# Patient Record
Sex: FEMALE | Race: WHITE | HISPANIC OR LATINO | Employment: FULL TIME | URBAN - NONMETROPOLITAN AREA
[De-identification: names, ages, dates, MRNs, and addresses within clinical notes are randomized per-mention and may not be internally consistent; named-entity substitution may affect disease eponyms.]

---

## 2020-08-11 PROCEDURE — 87635 SARS-COV-2 COVID-19 AMP PRB: CPT | Performed by: NURSE PRACTITIONER

## 2021-12-21 ENCOUNTER — OFFICE VISIT (OUTPATIENT)
Dept: BARIATRICS/WEIGHT MGMT | Facility: HOSPITAL | Age: 31
End: 2021-12-21

## 2021-12-21 ENCOUNTER — OFFICE VISIT (OUTPATIENT)
Dept: BARIATRICS/WEIGHT MGMT | Facility: CLINIC | Age: 31
End: 2021-12-21

## 2021-12-21 VITALS
WEIGHT: 258.4 LBS | DIASTOLIC BLOOD PRESSURE: 87 MMHG | TEMPERATURE: 98.4 F | OXYGEN SATURATION: 98 % | HEIGHT: 63 IN | HEART RATE: 89 BPM | BODY MASS INDEX: 45.79 KG/M2 | SYSTOLIC BLOOD PRESSURE: 130 MMHG

## 2021-12-21 DIAGNOSIS — E66.01 CLASS 3 SEVERE OBESITY DUE TO EXCESS CALORIES WITHOUT SERIOUS COMORBIDITY WITH BODY MASS INDEX (BMI) OF 45.0 TO 49.9 IN ADULT (HCC): Primary | ICD-10-CM

## 2021-12-21 PROCEDURE — 99203 OFFICE O/P NEW LOW 30 MIN: CPT | Performed by: SURGERY

## 2021-12-21 RX ORDER — EPINEPHRINE 1 MG/ML
INJECTION, SOLUTION INTRAMUSCULAR; SUBCUTANEOUS
COMMUNITY

## 2021-12-21 RX ORDER — ALBUTEROL SULFATE 90 UG/1
2 AEROSOL, METERED RESPIRATORY (INHALATION) EVERY 4 HOURS PRN
COMMUNITY

## 2021-12-21 NOTE — PROGRESS NOTES
"Nutrition Services    Patient Name:  Hannah Anguiano  YOB: 1990  MRN: [id number]  Admit Date:  (Not on file)    NUTRITION BARIATRIC/[facility] NOTE     Visit 1  Initial Assessment   BA#   [facility]    Anthropometrics   Height: 62.5 in  Weight: 258 lbs 6.4 oz  BMI: 46.51    Nutrition Recall  Eating ___2___ meals daily   Snacking  Making healthier choices  Excessive sweet intake  Drinking carbonated beverages-diet  Drinking less than 64 fluid ounces    Education    Goal Setting and Information Packet  4 meal per day diet plan  4 meal per day sample menu  \"Perfect Protein, Fiber in Foods, and Reducing Fat\"  Reinforce Nutritional Needs for Surgery  Reinforce Nutritional Needs for M[facility]    Nutrition Goals   Eat __4___ meals per day with protein  Eat protein first at meals  Protein goal: 65gms  discussed protein guidelines for shakes and bar  Eliminate snacks  Healthier food choices  Portion control / Use smaller plate or measuring cup   Eliminate soda  Increase fluid intake to 64 ounces per day      Electronically signed by:  Haleigh Velasco  12/21/21 16:03 CST   "

## 2021-12-21 NOTE — PROGRESS NOTES
Patient Care Team:  Rachel Villa APRN as PCP - General (Family Medicine)    Reason for Visit:  Surgical Weight loss    Subjective      Hannah Anguiano is a pleasant 31 y.o. female and presents with morbid obesity with her Body mass index is 46.51 kg/m².    She is here for discussion of weight loss options.  She stated she has been with the disease of obesity for year(s).  She stated she suffers from morbid obesity due to her weight gain.  She stated that weight loss helps alleviate these symptoms.   She stated that she has tried weight watchers, low-carb diets and medications such as phentermine to help with weight loss.  She stated that she has attempted these conservative methods for weight loss without maintaining long term success.  Today she would like to discuss surgical weight loss options such as the Laparoscopic Sleeve Gastrectomy or the Laparoscopic R - Y Gastric Bypass.      Review of Systems  General ROS: positive for  - fatigue and weight gain  Psychological ROS: negative  Respiratory ROS: no cough, shortness of breath, or wheezing  Cardiovascular ROS: no chest pain or dyspnea on exertion  Gastrointestinal ROS: no abdominal pain, change in bowel habits, or black or bloody stools  Genito-Urinary ROS: no dysuria, trouble voiding, or hematuria  positive for - dysmenorrhea and irregular/heavy menses  Musculoskeletal ROS: positive for - joint pain    History  History reviewed. No pertinent past medical history.  Past Surgical History:   Procedure Laterality Date   •  SECTION      x 3     Family History   Problem Relation Age of Onset   • Cancer Mother    • Hypertension Mother    • Heart disease Mother    • Obesity Mother    • Sleep apnea Mother    • Cancer Father    • No Known Problems Sister    • Obesity Brother    • Hypertension Brother    • Sleep apnea Brother    • Cancer Maternal Grandmother    • Obesity Maternal Grandmother    • Hypertension Maternal Grandmother    • Heart disease  Maternal Grandfather    • Hypertension Maternal Grandfather    • Cancer Paternal Grandmother    • Heart disease Paternal Grandfather      Social History     Tobacco Use   • Smoking status: Former Smoker   • Smokeless tobacco: Never Used   • Tobacco comment: smoked socially quit several ago   Substance Use Topics   • Alcohol use: Yes     Alcohol/week: 2.0 standard drinks     Types: 1 Glasses of wine, 1 Shots of liquor per week     Comment: occassionally   • Drug use: Not on file     E-cigarette/Vaping     E-cigarette/Vaping Substances     (Not in a hospital admission)    Allergies:  Fish-derived products, Kiwi extract, Morphine, Mustard, Pineapple, Wasp venom protein, Tuberculin, Latex, and Penicillins      Current Outpatient Medications:   •  albuterol sulfate  (90 Base) MCG/ACT inhaler, Inhale 2 puffs Every 4 (Four) Hours As Needed for Wheezing., Disp: , Rfl:   •  EPINEPHrine, Anaphylaxis, (ADRENALIN) 1 MG/ML injection, Inject  under the skin into the appropriate area as directed., Disp: , Rfl:   •  Loratadine (CLARITIN PO), Take  by mouth., Disp: , Rfl:     Objective     Vital Signs  Temp:  [98.4 °F (36.9 °C)] 98.4 °F (36.9 °C)  Heart Rate:  [89] 89  BP: (130)/(87) 130/87  Body mass index is 46.51 kg/m².      12/21/21  1338   Weight: 117 kg (258 lb 6.4 oz)       General Appearance:  awake, alert, oriented, in no acute distress  Lungs:  Normal expansion.  Clear to auscultation.  No rales, rhonchi, or wheezing.  Heart:  Heart regular rate and rhythm  Abdomen:  Soft, non-tender, normal bowel sounds; no bruits, organomegaly or masses.  Abnormal shape: obese      Results Review:   None        Assessment/Plan   Encounter Diagnoses   Name Primary?   • Class 3 severe obesity due to excess calories without serious comorbidity with body mass index (BMI) of 45.0 to 49.9 in adult (HCC) Yes       She has been provided a structured dietary regimen based off of her behavior.  I discussed with the patient the etiology of  "the disease of obesity and the potential comorbid conditions associated with this disease.  She was instructed to follow the dietary regimen and follow-up with our program in 1 month's time with any additional questions as they may arise during this time.  We emphasized on focusing on proteins and meals high in fiber as well as adequate hydration that exceed 64 ounces of water daily.  I recommended the patient record daily a food journal that would incorporate what she is eating and how many times she is eating during the day.  My recommendation included at least 21 consecutive days of recording of these meals.  I explained that I anticipate the patient to lose weight prior to her next monthly visit.  I have also explained that they need to record or document when they are going to have the \"cheat day\" as well as create a food journal to help monitor their behavior and food choices.  The patient was also made aware that if they inappropriately follow the dietary prescription there is a risk of weight gain.      I discussed the patient's findings and my recommendations with patient. The patient was made aware that we are primarily a surgical program.  We reviewed different weight loss surgical procedures including the laparoscopic sleeve gastrectomy, gastric band and Coleen-en-Y gastric bypass.  I explained to the patient that medical treatment alone is ineffective for long-term results and for the reversal of morbid obesity. She and I discussed the etiology of the disease of morbid obesity.  I emphasized that weight loss through conservative methods alone statistically will not reverse this disease of obesity long-term.    Our program is not a \"weight loss program\" but focuses on the overall issue of morbid obesity and the patient has been educated on what steps will be necessary to be successful in reversing this disease of morbid obesity at our facility.  The patient will require further evaluation of her foregut " either through an upper endoscopy/EGD or radiographic studies.  I have explained the 3 pearls of our program for the patient to follow to optimize success:1.  Putting their health first, 2.  Not trying to treat the disease on their own, 3.  Attempting to make their scheduled appointments.  The patient was in agreement to following these recommendations.  The patient was also notified that our dietitian will be contacting them soon for follow-up on how they are doing with the new prescription.    I have also recommended that she obtain preoperative cardiac and psychological risk assessments prior to surgery consideration.      Dr. Bill Yeung MD FACS    12/21/21  14:16 CST  Patient Care Team:  Rachel Villa APRN as PCP - General (Family Medicine)

## 2022-02-23 ENCOUNTER — OFFICE VISIT (OUTPATIENT)
Dept: BARIATRICS/WEIGHT MGMT | Facility: CLINIC | Age: 32
End: 2022-02-23

## 2022-02-23 VITALS
HEART RATE: 109 BPM | WEIGHT: 256.8 LBS | OXYGEN SATURATION: 98 % | HEIGHT: 63 IN | BODY MASS INDEX: 45.5 KG/M2 | TEMPERATURE: 98.4 F | DIASTOLIC BLOOD PRESSURE: 85 MMHG | SYSTOLIC BLOOD PRESSURE: 130 MMHG

## 2022-02-23 DIAGNOSIS — E66.01 CLASS 3 SEVERE OBESITY DUE TO EXCESS CALORIES WITHOUT SERIOUS COMORBIDITY WITH BODY MASS INDEX (BMI) OF 45.0 TO 49.9 IN ADULT: Primary | ICD-10-CM

## 2022-02-23 PROCEDURE — 99213 OFFICE O/P EST LOW 20 MIN: CPT | Performed by: SURGERY

## 2022-02-23 NOTE — PROGRESS NOTES
"Patient Care Team:  Rachel Villa APRN as PCP - General (Family Medicine)    Reason for Visit:  Surgical Weight loss      Subjective   Hannah Anguiano is a 31 y.o. female.     Hannah is here for follow-up and continued medical management of her morbid obesity.  She is currently on a prescription diet.  Hannah previously was to apply dietary changes such as following the meal plan as directed.  She admits to struggling to follow the dietary prescription.  The patient does consume group C in the form of corn for her last meal.  As a result she no significant change in weight since her last visit.    Review Of Systems:  General ROS: positive for  - fatigue and night sweats  Respiratory ROS: no cough, shortness of breath, or wheezing  Cardiovascular ROS: no chest pain or dyspnea on exertion  Gastrointestinal ROS: no abdominal pain, change in bowel habits, or black or bloody stools    The following portions of the patient's history were reviewed and updated as appropriate: allergies, current medications, past family history, past medical history, past social history, past surgical history and problem list.    Objective   /85 (BP Location: Right arm, Patient Position: Sitting, Cuff Size: Thigh Adult)   Pulse 109   Temp 98.4 °F (36.9 °C)   Ht 158.8 cm (62.5\")   Wt 116 kg (256 lb 12.8 oz)   SpO2 98%   BMI 46.22 kg/m²       02/23/22  1339   Weight: 116 kg (256 lb 12.8 oz)       General Appearance:  awake, alert, oriented, in no acute distress    Assessment/Plan     Encounter Diagnoses   Name Primary?   • Class 3 severe obesity due to excess calories without serious comorbidity with body mass index (BMI) of 45.0 to 49.9 in adult ([facility]) Yes       Hannah Anguiano was seen today for follow-up, obesity, nutrition counseling and weight loss.  She has no significant change in weight since her last visit.  Today we discussed healthy changes in lifestyle, diet, and exercise. Dietician consultation obtained.  Hannah Anguiano " had received handouts to her explaining the recommendation on portion sizes/appetite control/reading nutrition labels.   Intensive behavioral therapy for obesity was done today as well.   Goals for this month are:    1.  I requested the patient to simply reviewed the meal prescription prior to eating any of her meals during the day.  The patient was instructed to take a photo of the meal prescription to have on her at all times to help avoid haphazardly eating.  I have also encouraged her to continue eating 4 meals a day as directed.    2.  I provided the patient with food journal sheets for her to fill out and have prepared for her next visit with us.    Follow up in one month for a weight recheck.

## 2022-02-24 DIAGNOSIS — E66.01 CLASS 3 SEVERE OBESITY DUE TO EXCESS CALORIES WITHOUT SERIOUS COMORBIDITY WITH BODY MASS INDEX (BMI) OF 45.0 TO 49.9 IN ADULT: Primary | ICD-10-CM

## 2022-03-23 ENCOUNTER — OFFICE VISIT (OUTPATIENT)
Dept: BARIATRICS/WEIGHT MGMT | Facility: CLINIC | Age: 32
End: 2022-03-23

## 2022-03-23 ENCOUNTER — HOSPITAL ENCOUNTER (OUTPATIENT)
Dept: CARDIOLOGY | Facility: HOSPITAL | Age: 32
Discharge: HOME | End: 2022-03-23
Admitting: NURSE PRACTITIONER

## 2022-03-23 VITALS
OXYGEN SATURATION: 97 % | HEART RATE: 96 BPM | WEIGHT: 262 LBS | SYSTOLIC BLOOD PRESSURE: 135 MMHG | DIASTOLIC BLOOD PRESSURE: 81 MMHG | HEIGHT: 63 IN | TEMPERATURE: 98.6 F | BODY MASS INDEX: 46.42 KG/M2

## 2022-03-23 DIAGNOSIS — E66.01 CLASS 3 SEVERE OBESITY DUE TO EXCESS CALORIES WITHOUT SERIOUS COMORBIDITY WITH BODY MASS INDEX (BMI) OF 45.0 TO 49.9 IN ADULT: ICD-10-CM

## 2022-03-23 DIAGNOSIS — R06.83 SNORING: ICD-10-CM

## 2022-03-23 DIAGNOSIS — E66.01 CLASS 3 SEVERE OBESITY DUE TO EXCESS CALORIES WITHOUT SERIOUS COMORBIDITY WITH BODY MASS INDEX (BMI) OF 45.0 TO 49.9 IN ADULT: Primary | ICD-10-CM

## 2022-03-23 DIAGNOSIS — Z87.19 HISTORY OF ESOPHAGEAL REFLUX: ICD-10-CM

## 2022-03-23 DIAGNOSIS — R53.83 FATIGUE, UNSPECIFIED TYPE: ICD-10-CM

## 2022-03-23 PROCEDURE — 93005 ELECTROCARDIOGRAM TRACING: CPT | Performed by: NURSE PRACTITIONER

## 2022-03-23 PROCEDURE — 99214 OFFICE O/P EST MOD 30 MIN: CPT | Performed by: NURSE PRACTITIONER

## 2022-03-23 PROCEDURE — 93010 ELECTROCARDIOGRAM REPORT: CPT | Performed by: INTERNAL MEDICINE

## 2022-03-23 NOTE — PROGRESS NOTES
"Patient Care Team:  Rachel Villa APRN as PCP - General (Family Medicine)    Reason for Visit:  Surgical Weight loss    Subjective   Hannah Anguiano is a 31 y.o. female.     Hannah is here for follow-up and continued medical management of her morbid obesity.  She is currently on a prescription diet.  Hannah previously was to apply dietary changes such as following the meal plan as directed.  She admits to eating 3 meals per day.  As a result she gained weight since her last visit.  She has gained 8 lbs since her last appointment with us.   States her biggest struggle are her days off of work.  Patient states due to the lack of her routine and being busy she notices that she struggles getting compliant meals in.  She admits to drinking anywhere from 8 to 64 ounces of fluid each day and eating 60 to 80 g of protein each day.  She states that she exercises 2-3 times per week.  She states that she has been weaning off soda and intakes very little at this time.  She denies nicotine use.      Review Of Systems:  Review of Systems   Constitutional: Negative.    Respiratory: Negative.    Cardiovascular: Negative.    Gastrointestinal: Negative.    Endocrine: Negative.    Musculoskeletal: Negative.    Psychiatric/Behavioral: Negative.          The following portions of the patient's history were reviewed and updated as appropriate: allergies, current medications, past family history, past medical history, past social history, past surgical history, and problem list.    Objective   /81 (BP Location: Right arm, Patient Position: Sitting, Cuff Size: Adult)   Pulse 96   Temp 98.6 °F (37 °C)   Ht 158.8 cm (62.5\")   Wt 119 kg (262 lb)   SpO2 97%   BMI 47.16 kg/m²       03/23/22  1427   Weight: 119 kg (262 lb)       Physical Exam  Vitals reviewed.   Constitutional:       Appearance: She is obese.   Cardiovascular:      Rate and Rhythm: Normal rate and regular rhythm.   Pulmonary:      Effort: Pulmonary " effort is normal.   Musculoskeletal:         General: Normal range of motion.   Skin:     General: Skin is warm and dry.   Neurological:      Mental Status: She is alert and oriented to person, place, and time.   Psychiatric:         Mood and Affect: Mood normal.         Behavior: Behavior normal.         Patient's Body mass index is 47.16 kg/m². indicating that she is morbidly obese (BMI > 40 or > 35 with obesity - related health condition). Obesity-related health conditions include the following: none. Obesity is worsening. BMI is is above average; BMI management plan is completed. We discussed portion control and increasing exercise..     Assessment/Plan   Diagnoses and all orders for this visit:    1. Class 3 severe obesity due to excess calories without serious comorbidity with body mass index (BMI) of 45.0 to 49.9 in adult (HCC) (Primary)  -     Ambulatory Referral to Psychiatry  -     Ambulatory Referral to Neurology  -     Case Request; Standing  -     Case Request    2. Snoring  -     Ambulatory Referral to Neurology  -     Case Request; Standing  -     Case Request    3. Fatigue, unspecified type  -     Ambulatory Referral to Neurology    4. History of esophageal reflux  -     Case Request; Standing  -     Case Request       I believe this patient will be a good candidate for weight loss surgery.  I have discussed the Coleen - Y Gastric Bypass, laparoscopic sleeve gastrectomy and the Laparoscopic Gastric Band procedures.  We discussed the benefits of the surgeries including the benefit of weight loss and the possible reversal of co-morbid conditions associated with morbid obesity. I explained to the patient that prior to making a definitive decision on the type of surgery she will require an esophagogastroduodenoscopy with biopsies to assess for any contraindications for surgical weight loss.  The alternatives  include not doing anything, or pursuing an UGI series which only offers a diagnosis with potential  less accuracy compared to EGD. The benefits of the EGD such as identifying the pathology and anatomy of the upper GI system and the complications and risks of the procedure.  The risk of the endoscopy were discussed in detail. We discussed the risk of perforation (one out of 7720-3966, riskier with dilation), bleeding (one out of 500), and the rare risks of infection, adverse reaction to anesthesia, respiratory failure, cardiac failure including MI and adverse reaction to medications, etc. We discussed consequences that could occur if a risk were to develop such as the need for hospitalization, blood transfusion, surgical intervention, medications, pain, disability and death. The patient verbalizes understanding and agrees to proceed. such as bleeding, perforation, swallowing difficulties and gas bloat can occur after this procedure.  Upon completion of our discussion and addressing and answering her questions to her satisfation, informed consent was obtained.  She will be scheduled accordingly for the esophagogastroduodenoscopy procedure.  Hannah Anguiano was seen today for follow-up, obesity, nutrition counseling and weight loss.  She has gained weight since her last visit.  Today we discussed healthy changes in lifestyle, diet, and exercise. Dietician consultation obtained.  Hannah Anguiano had received handouts to her explaining the recommendation on portion sizes/appetite control/reading nutrition labels.   Intensive behavioral therapy for obesity was done today as well.     Goals for this month are:   1. Patient has BMI greater than 47, she admits to snoring and having apenic episodes.  I am referring patient to neurology for further evaluation for obstructive sleep apnea.  2.  Psychiatry referral placed today.  Patient encouraged to have appointment with them before her next visit with us.  3.  Endoscopy reviewed with patient.  Endoscopy will be scheduled today.  4.  I explained the  prescription meal plan further with patient.  I encouraged patient to become more compliant with the prescription meal plan.  I gave patient suggestions such as setting a timer on her phone on days that she is off from work to assist her with remembering to eat her for meals.  I also encouraged to plan the following day the night before to assist with compliance.  Patient verbalizes understanding.    Follow up in one month for a weight recheck.

## 2022-03-24 LAB
QT INTERVAL: 394 MS
QTC INTERVAL: 439 MS

## 2022-03-31 PROBLEM — R06.83 SNORING: Status: ACTIVE | Noted: 2022-03-31

## 2022-03-31 PROBLEM — Z87.19 HISTORY OF ESOPHAGEAL REFLUX: Status: ACTIVE | Noted: 2022-03-31

## 2022-04-05 DIAGNOSIS — Z20.822 ENCOUNTER FOR PREOPERATIVE SCREENING LABORATORY TESTING FOR COVID-19 VIRUS: Primary | ICD-10-CM

## 2022-04-05 DIAGNOSIS — Z01.812 ENCOUNTER FOR PREOPERATIVE SCREENING LABORATORY TESTING FOR COVID-19 VIRUS: Primary | ICD-10-CM

## 2022-05-18 ENCOUNTER — TELEPHONE (OUTPATIENT)
Dept: BARIATRICS/WEIGHT MGMT | Facility: CLINIC | Age: 32
End: 2022-05-18

## 2022-05-18 NOTE — TELEPHONE ENCOUNTER
BA-EGD       Patient was called and reminded of their procedure with Dr Yeung on 05/20/2022        Instructions:     1) Eat normal the day before your procedure until 8 p.m. in the evening.    2) Beginning at 8pm clear liquids only-no Red or Pink in Color   3) Nothing to eat or drink after midnight.  4) Bring a list of medications.   5) Advised that they must bring a  as that IV sedation is being used.           The patient voiced an understanding and was agreeable.

## 2022-05-20 ENCOUNTER — ANESTHESIA (OUTPATIENT)
Dept: GASTROENTEROLOGY | Facility: HOSPITAL | Age: 32
End: 2022-05-20

## 2022-05-20 ENCOUNTER — ANESTHESIA EVENT (OUTPATIENT)
Dept: GASTROENTEROLOGY | Facility: HOSPITAL | Age: 32
End: 2022-05-20

## 2022-05-20 ENCOUNTER — HOSPITAL ENCOUNTER (OUTPATIENT)
Facility: HOSPITAL | Age: 32
Setting detail: HOSPITAL OUTPATIENT SURGERY
Discharge: HOME | End: 2022-05-20
Attending: SURGERY | Admitting: SURGERY

## 2022-05-20 VITALS
RESPIRATION RATE: 16 BRPM | HEART RATE: 100 BPM | OXYGEN SATURATION: 98 % | SYSTOLIC BLOOD PRESSURE: 131 MMHG | HEIGHT: 62 IN | DIASTOLIC BLOOD PRESSURE: 91 MMHG | BODY MASS INDEX: 48.76 KG/M2 | TEMPERATURE: 97.1 F | WEIGHT: 265 LBS

## 2022-05-20 DIAGNOSIS — Z87.19 HISTORY OF ESOPHAGEAL REFLUX: ICD-10-CM

## 2022-05-20 DIAGNOSIS — R06.83 SNORING: ICD-10-CM

## 2022-05-20 DIAGNOSIS — E66.01 CLASS 3 SEVERE OBESITY DUE TO EXCESS CALORIES WITHOUT SERIOUS COMORBIDITY WITH BODY MASS INDEX (BMI) OF 45.0 TO 49.9 IN ADULT: ICD-10-CM

## 2022-05-20 LAB
B-HCG UR QL: NEGATIVE
TSH SERPL DL<=0.05 MIU/L-ACNC: 2.39 UIU/ML (ref 0.27–4.2)

## 2022-05-20 PROCEDURE — 81025 URINE PREGNANCY TEST: CPT | Performed by: NURSE ANESTHETIST, CERTIFIED REGISTERED

## 2022-05-20 PROCEDURE — 87081 CULTURE SCREEN ONLY: CPT | Performed by: SURGERY

## 2022-05-20 PROCEDURE — 25010000002 PROPOFOL 10 MG/ML EMULSION: Performed by: NURSE ANESTHETIST, CERTIFIED REGISTERED

## 2022-05-20 PROCEDURE — 84443 ASSAY THYROID STIM HORMONE: CPT | Performed by: NURSE PRACTITIONER

## 2022-05-20 RX ORDER — SODIUM CHLORIDE 9 MG/ML
500 INJECTION, SOLUTION INTRAVENOUS CONTINUOUS PRN
Status: CANCELLED | OUTPATIENT
Start: 2022-05-20

## 2022-05-20 RX ORDER — SODIUM CHLORIDE 9 MG/ML
500 INJECTION, SOLUTION INTRAVENOUS CONTINUOUS PRN
Status: DISCONTINUED | OUTPATIENT
Start: 2022-05-20 | End: 2022-05-20 | Stop reason: HOSPADM

## 2022-05-20 RX ORDER — SODIUM CHLORIDE 0.9 % (FLUSH) 0.9 %
10 SYRINGE (ML) INJECTION AS NEEDED
Status: DISCONTINUED | OUTPATIENT
Start: 2022-05-20 | End: 2022-05-20 | Stop reason: HOSPADM

## 2022-05-20 RX ORDER — LIDOCAINE HYDROCHLORIDE 10 MG/ML
0.5 INJECTION, SOLUTION EPIDURAL; INFILTRATION; INTRACAUDAL; PERINEURAL ONCE AS NEEDED
Status: CANCELLED | OUTPATIENT
Start: 2022-05-20

## 2022-05-20 RX ORDER — SODIUM CHLORIDE 0.9 % (FLUSH) 0.9 %
10 SYRINGE (ML) INJECTION AS NEEDED
Status: CANCELLED | OUTPATIENT
Start: 2022-05-20

## 2022-05-20 RX ORDER — SODIUM CHLORIDE 0.9 % (FLUSH) 0.9 %
10 SYRINGE (ML) INJECTION EVERY 12 HOURS SCHEDULED
Status: DISCONTINUED | OUTPATIENT
Start: 2022-05-20 | End: 2022-05-20 | Stop reason: HOSPADM

## 2022-05-20 RX ORDER — SODIUM CHLORIDE 9 MG/ML
100 INJECTION, SOLUTION INTRAVENOUS CONTINUOUS
Status: DISCONTINUED | OUTPATIENT
Start: 2022-05-20 | End: 2022-05-20 | Stop reason: HOSPADM

## 2022-05-20 RX ORDER — LIDOCAINE HYDROCHLORIDE 20 MG/ML
INJECTION, SOLUTION EPIDURAL; INFILTRATION; INTRACAUDAL; PERINEURAL AS NEEDED
Status: DISCONTINUED | OUTPATIENT
Start: 2022-05-20 | End: 2022-05-20 | Stop reason: SURG

## 2022-05-20 RX ORDER — PROPOFOL 10 MG/ML
VIAL (ML) INTRAVENOUS AS NEEDED
Status: DISCONTINUED | OUTPATIENT
Start: 2022-05-20 | End: 2022-05-20 | Stop reason: SURG

## 2022-05-20 RX ADMIN — LIDOCAINE HYDROCHLORIDE 200 MG: 20 INJECTION, SOLUTION EPIDURAL; INFILTRATION; INTRACAUDAL; PERINEURAL at 07:57

## 2022-05-20 RX ADMIN — PROPOFOL 200 MG: 10 INJECTION, EMULSION INTRAVENOUS at 07:57

## 2022-05-20 RX ADMIN — SODIUM CHLORIDE: 0.9 INJECTION, SOLUTION INTRAVENOUS at 07:55

## 2022-05-20 NOTE — ANESTHESIA PREPROCEDURE EVALUATION
Anesthesia Evaluation     Patient summary reviewed and Nursing notes reviewed   history of anesthetic complications (morphine allergy ):  NPO Solid Status: > 8 hours  NPO Liquid Status: > 4 hours           Airway   Mallampati: II  Dental      Pulmonary    (+) a smoker Former,   Cardiovascular - negative cardio ROS  Exercise tolerance: good (4-7 METS)        Neuro/Psych  GI/Hepatic/Renal/Endo    (+) morbid obesity,    (-) liver disease, no renal disease, diabetes    Musculoskeletal     Abdominal   (+) obese,    Substance History      OB/GYN          Other                        Anesthesia Plan    ASA 2     MAC     intravenous induction     Anesthetic plan, all risks, benefits, and alternatives have been provided, discussed and informed consent has been obtained with: patient.        CODE STATUS:

## 2022-05-20 NOTE — ANESTHESIA POSTPROCEDURE EVALUATION
"Patient: Hannah Anguiano    Procedure Summary     Date: 05/20/22 Room / Location:  [facility] /  [facility]    Anesthesia Start: 0755 Anesthesia Stop: 0804    Procedure: ESOPHAGOGASTRODUODENOSCOPY WITH ANESTHESIA (N/A ) Diagnosis:       Class 3 severe obesity due to excess calories without serious comorbidity with body mass index (BMI) of 45.0 to 49.9 in adult ([facility])      Snoring      History of esophageal reflux      (Class 3 severe obesity due to excess calories without serious comorbidity with body mass index (BMI) of 45.0 to 49.9 in adult (HCC) [E66.01, Z68.42])      (Snoring [R06.83])      (History of esophageal reflux [Z87.19])    Surgeons: Bill Yeung MD Provider: Juan Traylor CRNA    Anesthesia Type: MAC ASA Status: 2          Anesthesia Type: MAC    Vitals  Vitals Value Taken Time   BP     Temp     Pulse 86 05/20/22 0805   Resp     SpO2 98 % 05/20/22 0805   Vitals shown include unvalidated device data.        Post Anesthesia Care and Evaluation    Patient location during evaluation: PHASE II  Patient participation: complete - patient participated  Level of consciousness: awake and alert  Pain score: 0  Pain management: adequate  Airway patency: patent  Anesthetic complications: No anesthetic complications  PONV Status: none  Cardiovascular status: acceptable  Respiratory status: acceptable  Hydration status: acceptable    Comments: Blood pressure (!) 182/88, pulse 72, temperature 97.1 °F (36.2 °C), temperature source Temporal, resp. rate 20, height 157.5 cm (62\"), weight 120 kg (265 lb), SpO2 99 %.    Pt discharged from PACU based on ramírez score >8      "

## 2022-05-20 NOTE — BRIEF OP NOTE
ESOPHAGOGASTRODUODENOSCOPY WITH ANESTHESIA  Progress Note    Hannah nAguiano  5/20/2022    Pre-op Diagnosis:   Class 3 severe obesity due to excess calories without serious comorbidity with body mass index (BMI) of 45.0 to 49.9 in adult ([facility]) [E66.01, Z68.42]  Snoring [R06.83]  History of esophageal reflux [Z87.19]       Post-Op Diagnosis Codes:     * Class 3 severe obesity due to excess calories without serious comorbidity with body mass index (BMI) of 45.0 to 49.9 in adult ([facility]) [E66.01, Z68.42]     * Snoring [R06.83]     * History of esophageal reflux [Z87.19]    Procedure/CPT® Codes:        Procedure(s):  ESOPHAGOGASTRODUODENOSCOPY WITH ANESTHESIA    Surgeon(s):  Bill Yeung MD    Anesthesia: Monitored Anesthesia Care    Staff:   Endo Technician: Zee Hinton  Endo Nurse: Jaqueline Tilley RN         Estimated Blood Loss: minimal    Urine Voided: * No values recorded between 5/20/2022  7:55 AM and 5/20/2022  8:03 AM *    Specimens:                Specimens     ID Source Type Tests Collected By Collected At Frozen?    1 Stomach Tissue · UREASE FOR H PYLORI, 24 HR   Bill Yeung MD 5/20/22 0743     Description: ramakrishna                Findings: wnl        Complications: none          Bill Yeung MD     Date: 5/20/2022  Time: 08:11 CDT

## 2022-05-20 NOTE — BRIEF OP NOTE
ESOPHAGOGASTRODUODENOSCOPY WITH ANESTHESIA  Progress Note    Hannah Anguiano  5/20/2022    Pre-op Diagnosis:   Class 3 severe obesity due to excess calories without serious comorbidity with body mass index (BMI) of 45.0 to 49.9 in adult ([facility]) [E66.01, Z68.42]  Snoring [R06.83]  History of esophageal reflux [Z87.19]       Post-Op Diagnosis Codes:     * Class 3 severe obesity due to excess calories without serious comorbidity with body mass index (BMI) of 45.0 to 49.9 in adult ([facility]) [E66.01, Z68.42]     * Snoring [R06.83]     * History of esophageal reflux [Z87.19]    Procedure/CPT® Codes:        Procedure(s):  ESOPHAGOGASTRODUODENOSCOPY WITH ANESTHESIA    Surgeon(s):  Bill Yeung MD    Anesthesia: Monitored Anesthesia Care    Staff:   Endo Technician: Zee Hinton  Endo Nurse: Jaqueline Tilley RN         Estimated Blood Loss: minimal    Urine Voided: * No values recorded between 5/20/2022  7:55 AM and 5/20/2022  8:03 AM *    Specimens:                Specimens     ID Source Type Tests Collected By Collected At Frozen?    1 Stomach Tissue · UREASE FOR H PYLORI, 24 HR   Bill Yeung MD 5/20/22 0743     Description: ramakrishna                Findings: wnl        Complications: none          Bill Yeung MD     Date: 5/20/2022  Time: 08:10 CDT

## 2022-05-21 LAB — UREASE TISS QL: NEGATIVE

## 2022-05-25 NOTE — PROGRESS NOTES
"Patient Care Team:  Rachel Villa APRN as PCP - General (Family Medicine)    Reason for Visit:  Surgical Weight loss    Subjective   Hannah Anguiano is a 31 y.o. female.     Hannah is here for follow-up and continued medical management of her morbid obesity.  She is currently on a prescription diet.  Hannah previously was to apply dietary changes such as following the meal plan as directed.  She admits to eating 3 meals per day.  As a result she gained weight since her last visit.  She has gained 8 lbs since her last appointment with us.   States her biggest struggle are her days off of work.  Patient states due to the lack of her routine and being busy she notices that she struggles getting compliant meals in.  She admits to drinking anywhere from 8 to 64 ounces of fluid each day and eating 60 to 80 g of protein each day.  She states that she exercises 2-3 times per week.  She states that she has been weaning off soda and intakes very little at this time.  She denies nicotine use.      Review Of Systems:  Review of Systems   Constitutional: Negative.    Respiratory: Negative.    Cardiovascular: Negative.    Gastrointestinal: Negative.    Endocrine: Negative.    Musculoskeletal: Negative.    Psychiatric/Behavioral: Negative.          The following portions of the patient's history were reviewed and updated as appropriate: allergies, current medications, past family history, past medical history, past social history, past surgical history, and problem list.    Objective   /91   Pulse 100   Temp 97.1 °F (36.2 °C) (Temporal)   Resp 16   Ht 157.5 cm (62\")   Wt 120 kg (265 lb)   SpO2 98%   BMI 48.47 kg/m²       05/20/22  0737   Weight: 120 kg (265 lb)       Physical Exam  Vitals reviewed.   Constitutional:       Appearance: She is obese.   Cardiovascular:      Rate and Rhythm: Normal rate and regular rhythm.   Pulmonary:      Effort: Pulmonary effort is normal.   Musculoskeletal:         " General: Normal range of motion.   Skin:     General: Skin is warm and dry.   Neurological:      Mental Status: She is alert and oriented to person, place, and time.   Psychiatric:         Mood and Affect: Mood normal.         Behavior: Behavior normal.         Patient's Body mass index is 48.47 kg/m². indicating that she is morbidly obese (BMI > 40 or > 35 with obesity - related health condition). Obesity-related health conditions include the following: none. Obesity is worsening. BMI is is above average; BMI management plan is completed. We discussed portion control and increasing exercise..     Assessment & Plan   Diagnoses and all orders for this visit:    1. History of esophageal reflux  Overview:  Added automatically from request for surgery [id number]    Orders:  -     Cancel: Follow Anesthesia Guidelines / Protocol; Standing  -     Cancel: Follow Anesthesia Guidelines / Protocol  -     Urease For H Pylori - Tissue, Stomach; Standing  -     Urease For H Pylori - Tissue, Stomach    2. Class 3 severe obesity due to excess calories without serious comorbidity with body mass index (BMI) of 45.0 to 49.9 in adult (HCC)  -     Cancel: Follow Anesthesia Guidelines / Protocol; Standing  -     TSH; Standing  -     Cancel: Follow Anesthesia Guidelines / Protocol  -     TSH  -     Urease For H Pylori - Tissue, Stomach; Standing  -     Urease For H Pylori - Tissue, Stomach    3. Snoring  Overview:  Added automatically from request for surgery [id number]    Orders:  -     Urease For H Pylori - Tissue, Stomach; Standing  -     Urease For H Pylori - Tissue, Stomach    Other orders  -     Cancel: Initiate Anesthesia Protocol; Standing  -     Cancel: Insert Peripheral IV; Standing  -     Cancel: Maintain IV Access; Standing  -     Discontinue: sodium chloride 0.9 % flush 10 mL  -     Discontinue: sodium chloride 0.9 % infusion 500 mL  -     Pregnancy, Urine - Urine, Clean Catch; Standing  -     Cancel: Insert Peripheral IV  -      Pregnancy, Urine - Urine, Clean Catch  -     Cancel: Initiate Anesthesia Protocol  -     Cancel: Maintain IV Access  -     Cancel: Vital Signs - Per Anesthesia Protocol; Standing  -     Cancel: Oxygen Therapy- Nasal Cannula; Titrate for SPO2: equal to or greater than, 90%; Standing  -     Cancel: Pulse Oximetry, Continuous; Standing  -     Cancel: Insert Peripheral IV; Standing  -     Cancel: Saline Lock & Maintain IV Access; Standing  -     Discontinue: sodium chloride 0.9 % flush 10 mL  -     Discontinue: sodium chloride 0.9 % flush 10 mL  -     Discontinue: sodium chloride 0.9 % infusion  -     Cancel: Vital signs every 5 minutes for 15 minutes, every 15 minutes thereafter.; Standing  -     Cancel: Notify Anesthesia of Any Acute Changes in Patient Condition; Standing  -     Cancel: Notify Anesthesia for Unrelieved Pain; Standing  -     Cancel: Oxygen Therapy- Blow by - Humidified; Titrate for SPO2: equal to or greater than, 96%, per policy; Standing  -     Cancel: Pulse Oximetry, Continuous; Standing  -     Cancel: POC Glucose PRN; Standing  -     Cancel: Pregnancy, Urine -; Standing  -     Cancel: Once DC criteria to floor met, follow surgeon's orders.; Standing  -     Cancel: Discharge patient from PACU when discharge criteria is met.; Standing  -     Cancel: Vital signs every 5 minutes for 15 minutes, every 15 minutes thereafter.  -     Cancel: Notify Anesthesia of Any Acute Changes in Patient Condition  -     Cancel: Notify Anesthesia for Unrelieved Pain  -     Cancel: Oxygen Therapy- Blow by - Humidified; Titrate for SPO2: equal to or greater than, 96%, per policy  -     Cancel: Pulse Oximetry, Continuous  -     Cancel: Once DC criteria to floor met, follow surgeon's orders.  -     Cancel: Discharge patient from PACU when discharge criteria is met.  -     Cancel: Oxygen Therapy- Nasal Cannula; Titrate for SPO2: equal to or greater than, 90%  -     Cancel: Pulse Oximetry, Continuous  -     Cancel: Insert  Peripheral IV  -     Cancel: Saline Lock & Maintain IV Access  -     UPPER GI ENDOSCOPY  -     Discharge patient; Standing  -     Discharge patient     I believe this patient will be a good candidate for weight loss surgery.  I have discussed the Coleen - Y Gastric Bypass, laparoscopic sleeve gastrectomy and the Laparoscopic Gastric Band procedures.  We discussed the benefits of the surgeries including the benefit of weight loss and the possible reversal of co-morbid conditions associated with morbid obesity. I explained to the patient that prior to making a definitive decision on the type of surgery she will require an esophagogastroduodenoscopy with biopsies to assess for any contraindications for surgical weight loss.  The alternatives  include not doing anything, or pursuing an UGI series which only offers a diagnosis with potential less accuracy compared to EGD. The benefits of the EGD such as identifying the pathology and anatomy of the upper GI system and the complications and risks of the procedure.  The risk of the endoscopy were discussed in detail. We discussed the risk of perforation (one out of [id number], riskier with dilation), bleeding (one out of 500), and the rare risks of infection, adverse reaction to anesthesia, respiratory failure, cardiac failure including MI and adverse reaction to medications, etc. We discussed consequences that could occur if a risk were to develop such as the need for hospitalization, blood transfusion, surgical intervention, medications, pain, disability and death. The patient verbalizes understanding and agrees to proceed. such as bleeding, perforation, swallowing difficulties and gas bloat can occur after this procedure.  Upon completion of our discussion and addressing and answering her questions to her satisfation, informed consent was obtained.  She will be scheduled accordingly for the esophagogastroduodenoscopy procedure.  Hannah Anguiano was seen today for  follow-up, obesity, nutrition counseling and weight loss.  She has gained weight since her last visit.  Today we discussed healthy changes in lifestyle, diet, and exercise. Dietician consultation obtained.  Hannha Anguiano had received handouts to her explaining the recommendation on portion sizes/appetite control/reading nutrition labels.   Intensive behavioral therapy for obesity was done today as well.     Goals for this month are:   1. Patient has BMI greater than 47, she admits to snoring and having apenic episodes.  I am referring patient to neurology for further evaluation for obstructive sleep apnea.  2.  Psychiatry referral placed today.  Patient encouraged to have appointment with them before her next visit with us.  3.  Endoscopy reviewed with patient.  Endoscopy will be scheduled today.  4.  I explained the prescription meal plan further with patient.  I encouraged patient to become more compliant with the prescription meal plan.  I gave patient suggestions such as setting a timer on her phone on days that she is off from work to assist her with remembering to eat her for meals.  I also encouraged to plan the following day the night before to assist with compliance.  Patient verbalizes understanding.    Follow up in one month for a weight recheck.

## 2022-05-25 NOTE — H&P (VIEW-ONLY)
"Patient Care Team:  Rachel Villa APRN as PCP - General (Family Medicine)    Reason for Visit:  Surgical Weight loss    Subjective   Hannah Anguiano is a 31 y.o. female.     Hannah is here for follow-up and continued medical management of her morbid obesity.  She is currently on a prescription diet.  Hannah previously was to apply dietary changes such as following the meal plan as directed.  She admits to eating 3 meals per day.  As a result she gained weight since her last visit.  She has gained 8 lbs since her last appointment with us.   States her biggest struggle are her days off of work.  Patient states due to the lack of her routine and being busy she notices that she struggles getting compliant meals in.  She admits to drinking anywhere from 8 to 64 ounces of fluid each day and eating 60 to 80 g of protein each day.  She states that she exercises 2-3 times per week.  She states that she has been weaning off soda and intakes very little at this time.  She denies nicotine use.      Review Of Systems:  Review of Systems   Constitutional: Negative.    Respiratory: Negative.    Cardiovascular: Negative.    Gastrointestinal: Negative.    Endocrine: Negative.    Musculoskeletal: Negative.    Psychiatric/Behavioral: Negative.          The following portions of the patient's history were reviewed and updated as appropriate: allergies, current medications, past family history, past medical history, past social history, past surgical history, and problem list.    Objective   /91   Pulse 100   Temp 97.1 °F (36.2 °C) (Temporal)   Resp 16   Ht 157.5 cm (62\")   Wt 120 kg (265 lb)   SpO2 98%   BMI 48.47 kg/m²       05/20/22  0737   Weight: 120 kg (265 lb)       Physical Exam  Vitals reviewed.   Constitutional:       Appearance: She is obese.   Cardiovascular:      Rate and Rhythm: Normal rate and regular rhythm.   Pulmonary:      Effort: Pulmonary effort is normal.   Musculoskeletal:         " General: Normal range of motion.   Skin:     General: Skin is warm and dry.   Neurological:      Mental Status: She is alert and oriented to person, place, and time.   Psychiatric:         Mood and Affect: Mood normal.         Behavior: Behavior normal.         Patient's Body mass index is 48.47 kg/m². indicating that she is morbidly obese (BMI > 40 or > 35 with obesity - related health condition). Obesity-related health conditions include the following: none. Obesity is worsening. BMI is is above average; BMI management plan is completed. We discussed portion control and increasing exercise..     Assessment & Plan   Diagnoses and all orders for this visit:    1. History of esophageal reflux  Overview:  Added automatically from request for surgery [id number]    Orders:  -     Cancel: Follow Anesthesia Guidelines / Protocol; Standing  -     Cancel: Follow Anesthesia Guidelines / Protocol  -     Urease For H Pylori - Tissue, Stomach; Standing  -     Urease For H Pylori - Tissue, Stomach    2. Class 3 severe obesity due to excess calories without serious comorbidity with body mass index (BMI) of 45.0 to 49.9 in adult (HCC)  -     Cancel: Follow Anesthesia Guidelines / Protocol; Standing  -     TSH; Standing  -     Cancel: Follow Anesthesia Guidelines / Protocol  -     TSH  -     Urease For H Pylori - Tissue, Stomach; Standing  -     Urease For H Pylori - Tissue, Stomach    3. Snoring  Overview:  Added automatically from request for surgery [id number]    Orders:  -     Urease For H Pylori - Tissue, Stomach; Standing  -     Urease For H Pylori - Tissue, Stomach    Other orders  -     Cancel: Initiate Anesthesia Protocol; Standing  -     Cancel: Insert Peripheral IV; Standing  -     Cancel: Maintain IV Access; Standing  -     Discontinue: sodium chloride 0.9 % flush 10 mL  -     Discontinue: sodium chloride 0.9 % infusion 500 mL  -     Pregnancy, Urine - Urine, Clean Catch; Standing  -     Cancel: Insert Peripheral IV  -      Pregnancy, Urine - Urine, Clean Catch  -     Cancel: Initiate Anesthesia Protocol  -     Cancel: Maintain IV Access  -     Cancel: Vital Signs - Per Anesthesia Protocol; Standing  -     Cancel: Oxygen Therapy- Nasal Cannula; Titrate for SPO2: equal to or greater than, 90%; Standing  -     Cancel: Pulse Oximetry, Continuous; Standing  -     Cancel: Insert Peripheral IV; Standing  -     Cancel: Saline Lock & Maintain IV Access; Standing  -     Discontinue: sodium chloride 0.9 % flush 10 mL  -     Discontinue: sodium chloride 0.9 % flush 10 mL  -     Discontinue: sodium chloride 0.9 % infusion  -     Cancel: Vital signs every 5 minutes for 15 minutes, every 15 minutes thereafter.; Standing  -     Cancel: Notify Anesthesia of Any Acute Changes in Patient Condition; Standing  -     Cancel: Notify Anesthesia for Unrelieved Pain; Standing  -     Cancel: Oxygen Therapy- Blow by - Humidified; Titrate for SPO2: equal to or greater than, 96%, per policy; Standing  -     Cancel: Pulse Oximetry, Continuous; Standing  -     Cancel: POC Glucose PRN; Standing  -     Cancel: Pregnancy, Urine -; Standing  -     Cancel: Once DC criteria to floor met, follow surgeon's orders.; Standing  -     Cancel: Discharge patient from PACU when discharge criteria is met.; Standing  -     Cancel: Vital signs every 5 minutes for 15 minutes, every 15 minutes thereafter.  -     Cancel: Notify Anesthesia of Any Acute Changes in Patient Condition  -     Cancel: Notify Anesthesia for Unrelieved Pain  -     Cancel: Oxygen Therapy- Blow by - Humidified; Titrate for SPO2: equal to or greater than, 96%, per policy  -     Cancel: Pulse Oximetry, Continuous  -     Cancel: Once DC criteria to floor met, follow surgeon's orders.  -     Cancel: Discharge patient from PACU when discharge criteria is met.  -     Cancel: Oxygen Therapy- Nasal Cannula; Titrate for SPO2: equal to or greater than, 90%  -     Cancel: Pulse Oximetry, Continuous  -     Cancel: Insert  Peripheral IV  -     Cancel: Saline Lock & Maintain IV Access  -     UPPER GI ENDOSCOPY  -     Discharge patient; Standing  -     Discharge patient     I believe this patient will be a good candidate for weight loss surgery.  I have discussed the Coleen - Y Gastric Bypass, laparoscopic sleeve gastrectomy and the Laparoscopic Gastric Band procedures.  We discussed the benefits of the surgeries including the benefit of weight loss and the possible reversal of co-morbid conditions associated with morbid obesity. I explained to the patient that prior to making a definitive decision on the type of surgery she will require an esophagogastroduodenoscopy with biopsies to assess for any contraindications for surgical weight loss.  The alternatives  include not doing anything, or pursuing an UGI series which only offers a diagnosis with potential less accuracy compared to EGD. The benefits of the EGD such as identifying the pathology and anatomy of the upper GI system and the complications and risks of the procedure.  The risk of the endoscopy were discussed in detail. We discussed the risk of perforation (one out of [id number], riskier with dilation), bleeding (one out of 500), and the rare risks of infection, adverse reaction to anesthesia, respiratory failure, cardiac failure including MI and adverse reaction to medications, etc. We discussed consequences that could occur if a risk were to develop such as the need for hospitalization, blood transfusion, surgical intervention, medications, pain, disability and death. The patient verbalizes understanding and agrees to proceed. such as bleeding, perforation, swallowing difficulties and gas bloat can occur after this procedure.  Upon completion of our discussion and addressing and answering her questions to her satisfation, informed consent was obtained.  She will be scheduled accordingly for the esophagogastroduodenoscopy procedure.  Hannah Anguiano was seen today for  follow-up, obesity, nutrition counseling and weight loss.  She has gained weight since her last visit.  Today we discussed healthy changes in lifestyle, diet, and exercise. Dietician consultation obtained.  Hannah Anguiano had received handouts to her explaining the recommendation on portion sizes/appetite control/reading nutrition labels.   Intensive behavioral therapy for obesity was done today as well.     Goals for this month are:   1. Patient has BMI greater than 47, she admits to snoring and having apenic episodes.  I am referring patient to neurology for further evaluation for obstructive sleep apnea.  2.  Psychiatry referral placed today.  Patient encouraged to have appointment with them before her next visit with us.  3.  Endoscopy reviewed with patient.  Endoscopy will be scheduled today.  4.  I explained the prescription meal plan further with patient.  I encouraged patient to become more compliant with the prescription meal plan.  I gave patient suggestions such as setting a timer on her phone on days that she is off from work to assist her with remembering to eat her for meals.  I also encouraged to plan the following day the night before to assist with compliance.  Patient verbalizes understanding.    Follow up in one month for a weight recheck.

## 2022-06-23 ENCOUNTER — OFFICE VISIT (OUTPATIENT)
Dept: NEUROLOGY | Facility: CLINIC | Age: 32
End: 2022-06-23

## 2022-06-23 VITALS
WEIGHT: 265 LBS | HEART RATE: 92 BPM | DIASTOLIC BLOOD PRESSURE: 62 MMHG | RESPIRATION RATE: 16 BRPM | HEIGHT: 62 IN | SYSTOLIC BLOOD PRESSURE: 116 MMHG | OXYGEN SATURATION: 98 % | BODY MASS INDEX: 48.76 KG/M2

## 2022-06-23 DIAGNOSIS — G47.19 DAYTIME HYPERSOMNOLENCE: Primary | ICD-10-CM

## 2022-06-23 DIAGNOSIS — R06.83 SNORING: ICD-10-CM

## 2022-06-23 PROCEDURE — 99213 OFFICE O/P EST LOW 20 MIN: CPT | Performed by: NURSE PRACTITIONER

## 2022-06-23 RX ORDER — DUPILUMAB 300 MG/2ML
INJECTION, SOLUTION SUBCUTANEOUS
COMMUNITY
Start: 2022-06-21

## 2022-06-23 NOTE — PROGRESS NOTES
Neurology Progress Note      Chief Complaint:    Obstructive sleep apnea     Subjective     Subjective:  Hannah Anguiano is a 31 y.o. female who presents to the office today for obstructive sleep apnea.  She is routinely followed by Rachel Villa APRN for primary care.  She is referred to our office by SHIRA Nguyen as she is being evaluated through the bariatric surgery program.  She does indicate that she has some issues with her sleep.  She states at times she has some difficulties falling asleep.  She also mentions that she has some issues with maintenance sleep at times.  She does have significant snoring and has been told that she has apneic episodes while she is sleeping.  She does have a family history of sleep apnea.  She indicates that while she does have intermittent nonrestorative sleep the majority of the time she will wake up with nonrestorative sleep.  She also indicates some intermittent daytime somnolence. She will find herself becoming very tired and wanting to doze off when she has a slow period at work.  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  She does not find herself falling asleep or becoming drowsy when she is sitting in a stoplight.  She does indicate that she will take naps and she does not work.  She also indicates that she will find herself drifting to sleep while she is try to watch television or sitting relatively sedentary.    History reviewed. No pertinent past medical history.  Past Surgical History:   Procedure Laterality Date   •  SECTION      x 3   • ENDOSCOPY N/A 2022    Procedure: ESOPHAGOGASTRODUODENOSCOPY WITH ANESTHESIA;  Surgeon: Bill Yeung MD;  Location: [facility] ENDOSCOPY;  Service: General;  Laterality: N/A;  preop; GERD; obesity  postop; normal   PCP Rachel Villa APRN     Family History   Problem Relation Age of Onset   • Cancer Mother    • Hypertension  Mother    • Heart disease Mother    • Obesity Mother    • Sleep apnea Mother    • Cancer Father    • No Known Problems Sister    • Obesity Brother    • Hypertension Brother    • Sleep apnea Brother    • Cancer Maternal Grandmother    • Obesity Maternal Grandmother    • Hypertension Maternal Grandmother    • Heart disease Maternal Grandfather    • Hypertension Maternal Grandfather    • Cancer Paternal Grandmother    • Heart disease Paternal Grandfather      Social History     Tobacco Use   • Smoking status: Former Smoker   • Smokeless tobacco: Never Used   • Tobacco comment: smoked socially quit several ago   Substance Use Topics   • Alcohol use: Yes     Alcohol/week: 2.0 standard drinks     Types: 1 Glasses of wine, 1 Shots of liquor per week     Comment: occassionally     Medications:  Current Outpatient Medications   Medication Sig Dispense Refill   • albuterol sulfate  (90 Base) MCG/ACT inhaler Inhale 2 puffs Every 4 (Four) Hours As Needed for Wheezing.     • Dupixent 300 MG/2ML solution pen-injector      • EPINEPHrine, Anaphylaxis, (ADRENALIN) 1 MG/ML injection Inject  under the skin into the appropriate area as directed.     • Loratadine (CLARITIN PO) Take  by mouth.       No current facility-administered medications for this visit.     Current outpatient and discharge medications have been reconciled for the patient.  Reviewed by: SHIRA Pedro      Allergies:    Fish-derived products, Kiwi extract, Morphine, Mustard, Pineapple, Wasp venom protein, Tuberculin, Latex, and Penicillins    Review of Systems:   Review of Systems   Psychiatric/Behavioral: Positive for sleep disturbance.   All other systems reviewed and are negative.        Objective      Vital Signs  Heart Rate:  [92] 92  Resp:  [16] 16  BP: (116)/(62) 116/62    Physical Exam:  Physical Exam  Vitals reviewed.   Constitutional:       Appearance: Normal appearance. She is obese.   HENT:      Head: Normocephalic.   Eyes:      Extraocular  Movements: Extraocular movements intact.      Pupils: Pupils are equal, round, and reactive to light.   Cardiovascular:      Rate and Rhythm: Normal rate and regular rhythm.      Pulses: Normal pulses.   Pulmonary:      Effort: Pulmonary effort is normal.   Musculoskeletal:         General: Normal range of motion.      Cervical back: Normal range of motion and neck supple.   Skin:     General: Skin is warm and dry.      Capillary Refill: Capillary refill takes less than 2 seconds.   Neurological:      General: No focal deficit present.      Mental Status: She is alert and oriented to person, place, and time. Mental status is at baseline.      Cranial Nerves: Cranial nerves are intact.      Sensory: Sensation is intact.      Motor: Motor function is intact.      Coordination: Coordination is intact.      Gait: Gait is intact.      Deep Tendon Reflexes: Reflexes are normal and symmetric.   Psychiatric:         Mood and Affect: Mood normal.         Neck Circumference: 15.5 inches  Mallampati Classification: III (soft and hard palate and base of uvula visible)       Results Review:      Bristow Sleepiness Scale: 9    STOP-BANG: Moderate Risk LITA    Assessment/Plan     Impression:  • Daytime somnolence  • Suspect sleep apnea    Plan:  · Home sleep study for evaluation of underlying sleep disordered breathing    · I have recommended regular cardiovascular exercise in the form of walking, biking or swimming 30-40 minutes at a time at least 3-4 times per week.    · Counseled on multimodal approach to treatment of sleep apnea to include but not limited to diet, exercise, sleep hygiene, compliance with pap therapy.     · Encouraged lateral sleeping position and to avoid sedatives or alcohol close to bedtime.     · Risks of untreated sleep apnea were discussed to include but not limited to HTN, heart disease, stroke, cardiac arrhythmia such as AFIB, and dementia.    The plan of care was fully discussed with the patient and  they are in full agreement at this time.     Follow-Up:  Return in about 3 months (around 9/23/2022) for HST Results.      Je Shabazz, HSIRA  06/23/22  15:44 CDT

## 2022-07-18 ENCOUNTER — HOSPITAL ENCOUNTER (OUTPATIENT)
Dept: SLEEP MEDICINE | Facility: HOSPITAL | Age: 32
Discharge: HOME | End: 2022-07-18
Admitting: NURSE PRACTITIONER

## 2022-07-18 DIAGNOSIS — G47.19 DAYTIME HYPERSOMNOLENCE: ICD-10-CM

## 2022-07-18 PROCEDURE — 95800 SLP STDY UNATTENDED: CPT

## 2022-07-18 PROCEDURE — 95800 SLP STDY UNATTENDED: CPT | Performed by: PSYCHIATRY & NEUROLOGY

## 2022-09-09 ENCOUNTER — OFFICE VISIT (OUTPATIENT)
Dept: BARIATRICS/WEIGHT MGMT | Facility: CLINIC | Age: 32
End: 2022-09-09

## 2022-09-09 VITALS
TEMPERATURE: 98.4 F | HEART RATE: 77 BPM | BODY MASS INDEX: 46.35 KG/M2 | OXYGEN SATURATION: 97 % | HEIGHT: 63 IN | WEIGHT: 261.6 LBS | SYSTOLIC BLOOD PRESSURE: 131 MMHG | DIASTOLIC BLOOD PRESSURE: 83 MMHG

## 2022-09-09 DIAGNOSIS — E66.01 CLASS 3 SEVERE OBESITY DUE TO EXCESS CALORIES WITHOUT SERIOUS COMORBIDITY WITH BODY MASS INDEX (BMI) OF 45.0 TO 49.9 IN ADULT: Primary | ICD-10-CM

## 2022-09-09 DIAGNOSIS — G47.33 OBSTRUCTIVE SLEEP APNEA: ICD-10-CM

## 2022-09-09 PROCEDURE — 99213 OFFICE O/P EST LOW 20 MIN: CPT | Performed by: NURSE PRACTITIONER

## 2022-09-09 RX ORDER — CLOBETASOL PROPIONATE 0.5 MG/G
1 CREAM TOPICAL 2 TIMES DAILY
COMMUNITY

## 2022-09-09 RX ORDER — PIMECROLIMUS 10 MG/G
1 CREAM TOPICAL 2 TIMES DAILY
COMMUNITY

## 2022-09-09 RX ORDER — TRIAMCINOLONE ACETONIDE 0.25 MG/G
1 CREAM TOPICAL 2 TIMES DAILY
COMMUNITY

## 2022-09-09 NOTE — PROGRESS NOTES
"Patient Care Team:  Rachel Villa APRN as PCP - General (Family Medicine)    Reason for Visit:  Surgical Weight loss    Subjective   Hannah Anguiano is a 31 y.o. female.     Hannah is here for follow-up and continued medical management of her morbid obesity.  She is currently on a prescription diet.  Hannah previously was to apply dietary changes such as following the meal plan as directed.  She admits to eating 2-3 meals per day.  As a result she did not lose any significant amount of weight since her last visit.  She admits to drinking around 48 ounces of fluid each day.  She is going to the gym 2-3 times per week for 1 hour intervals by utilizing the [employer].    Review Of Systems:  Review of Systems   Constitutional: Positive for fatigue.   Respiratory: Negative.    Cardiovascular: Negative.    Gastrointestinal: Negative.    Endocrine: Negative.    Musculoskeletal: Negative.    Psychiatric/Behavioral: Positive for sleep disturbance.       The following portions of the patient's history were reviewed and updated as appropriate: allergies, current medications, past family history, past medical history, past social history, past surgical history, and problem list.    Objective   /83 (BP Location: Right arm, Patient Position: Sitting, Cuff Size: Adult)   Pulse 77   Temp 98.4 °F (36.9 °C)   Ht 158.8 cm (62.5\")   Wt 119 kg (261 lb 9.6 oz)   SpO2 97%   BMI 47.08 kg/m²       09/09/22  0936   Weight: 119 kg (261 lb 9.6 oz)       Physical Exam  Vitals reviewed.   Constitutional:       Appearance: She is obese.   Cardiovascular:      Rate and Rhythm: Normal rate and regular rhythm.   Pulmonary:      Effort: Pulmonary effort is normal.   Skin:     General: Skin is warm and dry.   Neurological:      Mental Status: She is alert and oriented to person, place, and time.   Psychiatric:         Mood and Affect: Mood normal.         Behavior: Behavior normal.         Class 3 Severe Obesity (BMI " >=40). Obesity-related health conditions include the following: obstructive sleep apnea. Obesity is improving with treatment. BMI is is above average; BMI management plan is completed. We discussed portion control and increasing exercise.     Assessment & Plan   Diagnoses and all orders for this visit:    1. Class 3 severe obesity due to excess calories without serious comorbidity with body mass index (BMI) of 45.0 to 49.9 in adult (HCC) (Primary)  Assessment & Plan:  Patient's (Body mass index is 47.08 kg/m².) indicates that they are morbidly obese (BMI > 40 or > 35 with obesity - related health condition) with health conditions that include obstructive sleep apnea . Weight is improving with treatment. BMI is is above average; BMI management plan is completed. We discussed portion control and increasing exercise.       2. Obstructive sleep apnea  Comments:  Patient states she does not want to utilize her CPAP at this time and would like to focus on weight control      Urease For H Pylori - Tissue, Stomach (05/20/2022 07:43)  TSH (05/20/2022 07:37)  Home Sleep Study (07/19/2022 07:41)  UPPER GI ENDOSCOPY (05/20/2022 07:55)  ECG 12 Lead (03/23/2022 12:21)  PATIENT EDUCATION - SCAN - BA seminar (01/14/2021)        Psychiatric clearance requested.      Hannah Anguiano was seen today for follow-up, obesity, nutrition counseling and weight loss.  She has lost weight since her last visit.  Today we discussed healthy changes in lifestyle, diet, and exercise. Dietician consultation obtained.  Hannah Anguiano had received handouts to her explaining the recommendation on portion sizes/appetite control/reading nutrition labels.   Intensive behavioral therapy for obesity was done today as well.     Goals for this month are:   1.  Patient has not came to appointments consistently due to other issues within her lifestyle per patient.  She has completed all preoperative work-up.  She has lost 1 pound since her  last appointment.  Patient is not following the prescription meal plan.  She states that she is consistently eating around 2-3 meals per day and is not getting adequate vegetable intake in.  Patient does not plan ahead meals and does not meal prep.  Some goals that this month were to focus on consistently eating 4 meals per day and to increase vegetable intake.  We also discussed utilizing a protein supplement.  I also encouraged patient to increase water intake.  Patient will meet with Dr. Yeung next month to determine if she can proceed at this time with a sleeve gastrectomy or if we need to continue more consistent appointments and will schedule at a later date.  Patient is aware and agreeable with this plan.    Follow up in one month for a weight recheck.

## 2022-09-09 NOTE — ASSESSMENT & PLAN NOTE
Patient's (Body mass index is 47.08 kg/m².) indicates that they are morbidly obese (BMI > 40 or > 35 with obesity - related health condition) with health conditions that include obstructive sleep apnea . Weight is improving with treatment. BMI is is above average; BMI management plan is completed. We discussed portion control and increasing exercise.

## 2022-09-15 ENCOUNTER — TELEPHONE (OUTPATIENT)
Dept: BARIATRICS/WEIGHT MGMT | Facility: CLINIC | Age: 32
End: 2022-09-15

## 2022-09-15 NOTE — TELEPHONE ENCOUNTER
Patient returned call and states she was seen virtually with a male provider. I advised her I would reach out to Ruth to see if patient possibly spoke with the [facility]. Patient states she is unsure who she was cleared by.

## 2022-09-15 NOTE — TELEPHONE ENCOUNTER
I contacted patient to discuss her psychiatric clearance.  Patient states that she has been cleared by psychiatry.  We referred patient to [facility] and after contacting them they state that patient told them that she has been seen and assessed by someone else per Ruth Cannon.     I attempted to contact patient to see where she had her psychiatric evaluation done at.  There was no answer.  Voicemail left requesting patient to return our call.

## 2022-11-08 ENCOUNTER — OFFICE VISIT (OUTPATIENT)
Dept: BARIATRICS/WEIGHT MGMT | Facility: CLINIC | Age: 32
End: 2022-11-08

## 2022-11-08 VITALS
HEIGHT: 63 IN | SYSTOLIC BLOOD PRESSURE: 139 MMHG | OXYGEN SATURATION: 96 % | TEMPERATURE: 98.4 F | DIASTOLIC BLOOD PRESSURE: 84 MMHG | HEART RATE: 90 BPM | WEIGHT: 265 LBS | BODY MASS INDEX: 46.95 KG/M2

## 2022-11-08 DIAGNOSIS — E66.01 CLASS 3 SEVERE OBESITY DUE TO EXCESS CALORIES WITHOUT SERIOUS COMORBIDITY WITH BODY MASS INDEX (BMI) OF 45.0 TO 49.9 IN ADULT: Primary | ICD-10-CM

## 2022-11-08 DIAGNOSIS — Z87.19 HISTORY OF ESOPHAGEAL REFLUX: ICD-10-CM

## 2022-11-08 DIAGNOSIS — G47.33 OBSTRUCTIVE SLEEP APNEA: ICD-10-CM

## 2022-11-08 PROCEDURE — 99213 OFFICE O/P EST LOW 20 MIN: CPT | Performed by: SURGERY

## 2022-11-08 RX ORDER — TRETINOIN 1 MG/G
1 CREAM TOPICAL NIGHTLY
COMMUNITY

## 2022-11-08 NOTE — PROGRESS NOTES
"Patient Care Team:  Rachel Villa APRN as PCP - General (Family Medicine)    Reason for Visit:  Surgical Weight loss      Subjective   Hannah Anguiano is a 32 y.o. female.     Hannah is here for follow-up and continued medical management of her morbid obesity.  She is currently on a prescription diet.  Hannah previously was to apply dietary changes such as following the meal plan as directed.  She admits to struggling to follow dietary prescription.  As a result she gain weight since her last visit.    Review Of Systems:  General ROS: positive for  - fatigue and weight gain  Respiratory ROS: no cough, shortness of breath, or wheezing  Cardiovascular ROS: no chest pain or dyspnea on exertion  Gastrointestinal ROS: no abdominal pain, change in bowel habits, or black or bloody stools    The following portions of the patient's history were reviewed and updated as appropriate: allergies, current medications, past family history, past medical history, past social history, past surgical history and problem list.    Objective   /84 (BP Location: Right arm, Patient Position: Sitting, Cuff Size: Adult)   Pulse 90   Temp 98.4 °F (36.9 °C)   Ht 158.8 cm (62.5\")   Wt 120 kg (265 lb)   SpO2 96%   BMI 47.70 kg/m²       11/08/22  1419   Weight: 120 kg (265 lb)       General Appearance:  awake, alert, oriented, in no acute distress    Assessment & Plan     Encounter Diagnoses   Name Primary?   • Class 3 severe obesity due to excess calories without serious comorbidity with body mass index (BMI) of 45.0 to 49.9 in adult ([facility]) Yes   • History of esophageal reflux    • Obstructive sleep apnea        Hannah Anguiano was seen today for follow-up, obesity, nutrition counseling and weight loss.  She has gained weight since her last visit.  Today we discussed healthy changes in lifestyle, diet, and exercise. Dietician consultation obtained.  Hannah Anguiano had received handouts to her " explaining the recommendation on portion sizes/appetite control/reading nutrition labels.   Intensive behavioral therapy for obesity was done today as well.    A total of 20 minutes was spent face-to-face with this patient during this encounter and over half the time was spent on counseling and coordination of care for morbid obesity.  Goals for this month are: I requested the patient bring a food journal on her next visit.  She is going to incorporate utilizing the dietary prescription as directed.  Modification of the diet will include incorporating group C occasionally at her last meal because she does struggle with food choices at the end of the day.  We will eventually try to wean her off of the high carb processed foods.    Follow up in one month for a weight recheck.

## 2024-04-12 ENCOUNTER — HOSPITAL ENCOUNTER (EMERGENCY)
Facility: HOSPITAL | Age: 34
Discharge: HOME | End: 2024-04-12
Payer: COMMERCIAL

## 2024-04-12 VITALS
BODY MASS INDEX: 39.34 KG/M2 | DIASTOLIC BLOOD PRESSURE: 88 MMHG | HEIGHT: 63 IN | TEMPERATURE: 97.6 F | RESPIRATION RATE: 20 BRPM | HEART RATE: 77 BPM | WEIGHT: 222 LBS | SYSTOLIC BLOOD PRESSURE: 140 MMHG | OXYGEN SATURATION: 99 %

## 2024-04-12 DIAGNOSIS — T81.49XA SURGICAL WOUND INFECTION: ICD-10-CM

## 2024-04-12 DIAGNOSIS — Z98.890 S/P BRACHIOPLASTY: Primary | ICD-10-CM

## 2024-04-12 DIAGNOSIS — T81.30XA WOUND DEHISCENCE: ICD-10-CM

## 2024-04-12 LAB
ALBUMIN SERPL-MCNC: 3.6 G/DL (ref 3.5–5.2)
ALBUMIN/GLOB SERPL: 1 G/DL
ALP SERPL-CCNC: 69 U/L (ref 39–117)
ALT SERPL W P-5'-P-CCNC: 9 U/L (ref 1–33)
ANION GAP SERPL CALCULATED.3IONS-SCNC: 12 MMOL/L (ref 5–15)
AST SERPL-CCNC: 12 U/L (ref 1–32)
BASOPHILS # BLD AUTO: 0.07 10*3/MM3 (ref 0–0.2)
BASOPHILS NFR BLD AUTO: 1.2 % (ref 0–1.5)
BILIRUB SERPL-MCNC: 0.3 MG/DL (ref 0–1.2)
BUN SERPL-MCNC: 9 MG/DL (ref 6–20)
BUN/CREAT SERPL: 17.6 (ref 7–25)
CALCIUM SPEC-SCNC: 9.4 MG/DL (ref 8.6–10.5)
CHLORIDE SERPL-SCNC: 105 MMOL/L (ref 98–107)
CO2 SERPL-SCNC: 21 MMOL/L (ref 22–29)
CREAT SERPL-MCNC: 0.51 MG/DL (ref 0.57–1)
D-LACTATE SERPL-SCNC: 1.4 MMOL/L (ref 0.5–2)
DEPRECATED RDW RBC AUTO: 40.8 FL (ref 37–54)
EGFRCR SERPLBLD CKD-EPI 2021: 125 ML/MIN/1.73
EOSINOPHIL # BLD AUTO: 0.24 10*3/MM3 (ref 0–0.4)
EOSINOPHIL NFR BLD AUTO: 4 % (ref 0.3–6.2)
ERYTHROCYTE [DISTWIDTH] IN BLOOD BY AUTOMATED COUNT: 12.7 % (ref 12.3–15.4)
GLOBULIN UR ELPH-MCNC: 3.7 GM/DL
GLUCOSE SERPL-MCNC: 91 MG/DL (ref 65–99)
HCT VFR BLD AUTO: 35.1 % (ref 34–46.6)
HGB BLD-MCNC: 11.3 G/DL (ref 12–15.9)
IMM GRANULOCYTES # BLD AUTO: 0.02 10*3/MM3 (ref 0–0.05)
IMM GRANULOCYTES NFR BLD AUTO: 0.3 % (ref 0–0.5)
LYMPHOCYTES # BLD AUTO: 1.91 10*3/MM3 (ref 0.7–3.1)
LYMPHOCYTES NFR BLD AUTO: 32 % (ref 19.6–45.3)
MCH RBC QN AUTO: 28.3 PG (ref 26.6–33)
MCHC RBC AUTO-ENTMCNC: 32.2 G/DL (ref 31.5–35.7)
MCV RBC AUTO: 88 FL (ref 79–97)
MONOCYTES # BLD AUTO: 0.48 10*3/MM3 (ref 0.1–0.9)
MONOCYTES NFR BLD AUTO: 8.1 % (ref 5–12)
NEUTROPHILS NFR BLD AUTO: 3.24 10*3/MM3 (ref 1.7–7)
NEUTROPHILS NFR BLD AUTO: 54.4 % (ref 42.7–76)
NRBC BLD AUTO-RTO: 0 /100 WBC (ref 0–0.2)
PLATELET # BLD AUTO: 320 10*3/MM3 (ref 140–450)
PMV BLD AUTO: 11.1 FL (ref 6–12)
POTASSIUM SERPL-SCNC: 4 MMOL/L (ref 3.5–5.2)
PROCALCITONIN SERPL-MCNC: 0.04 NG/ML (ref 0–0.25)
PROT SERPL-MCNC: 7.3 G/DL (ref 6–8.5)
RBC # BLD AUTO: 3.99 10*6/MM3 (ref 3.77–5.28)
SODIUM SERPL-SCNC: 138 MMOL/L (ref 136–145)
WBC NRBC COR # BLD AUTO: 5.96 10*3/MM3 (ref 3.4–10.8)

## 2024-04-12 PROCEDURE — 87186 SC STD MICRODIL/AGAR DIL: CPT | Performed by: PHYSICIAN ASSISTANT

## 2024-04-12 PROCEDURE — 96365 THER/PROPH/DIAG IV INF INIT: CPT

## 2024-04-12 PROCEDURE — 87077 CULTURE AEROBIC IDENTIFY: CPT | Performed by: PHYSICIAN ASSISTANT

## 2024-04-12 PROCEDURE — 87070 CULTURE OTHR SPECIMN AEROBIC: CPT | Performed by: PHYSICIAN ASSISTANT

## 2024-04-12 PROCEDURE — 84145 PROCALCITONIN (PCT): CPT | Performed by: PHYSICIAN ASSISTANT

## 2024-04-12 PROCEDURE — 25010000002 VANCOMYCIN 10 G RECONSTITUTED SOLUTION: Performed by: PHYSICIAN ASSISTANT

## 2024-04-12 PROCEDURE — 80053 COMPREHEN METABOLIC PANEL: CPT | Performed by: PHYSICIAN ASSISTANT

## 2024-04-12 PROCEDURE — 25810000003 SODIUM CHLORIDE 0.9 % SOLUTION: Performed by: PHYSICIAN ASSISTANT

## 2024-04-12 PROCEDURE — 87040 BLOOD CULTURE FOR BACTERIA: CPT | Performed by: PHYSICIAN ASSISTANT

## 2024-04-12 PROCEDURE — 85025 COMPLETE CBC W/AUTO DIFF WBC: CPT | Performed by: PHYSICIAN ASSISTANT

## 2024-04-12 PROCEDURE — 99283 EMERGENCY DEPT VISIT LOW MDM: CPT

## 2024-04-12 PROCEDURE — 87205 SMEAR GRAM STAIN: CPT | Performed by: PHYSICIAN ASSISTANT

## 2024-04-12 PROCEDURE — 36415 COLL VENOUS BLD VENIPUNCTURE: CPT

## 2024-04-12 PROCEDURE — 83605 ASSAY OF LACTIC ACID: CPT | Performed by: PHYSICIAN ASSISTANT

## 2024-04-12 PROCEDURE — 96366 THER/PROPH/DIAG IV INF ADDON: CPT

## 2024-04-12 RX ORDER — VANCOMYCIN 2 GRAM/500 ML IN 0.9 % SODIUM CHLORIDE INTRAVENOUS
20 ONCE
Status: COMPLETED | OUTPATIENT
Start: 2024-04-12 | End: 2024-04-12

## 2024-04-12 RX ORDER — SODIUM CHLORIDE 0.9 % (FLUSH) 0.9 %
10 SYRINGE (ML) INJECTION AS NEEDED
Status: DISCONTINUED | OUTPATIENT
Start: 2024-04-12 | End: 2024-04-13 | Stop reason: HOSPADM

## 2024-04-12 RX ADMIN — VANCOMYCIN HYDROCHLORIDE 2000 MG: 10 INJECTION, POWDER, LYOPHILIZED, FOR SOLUTION INTRAVENOUS at 19:42

## 2024-04-12 RX ADMIN — SODIUM CHLORIDE 1000 ML: 9 INJECTION, SOLUTION INTRAVENOUS at 19:45

## 2024-04-12 NOTE — ED PROVIDER NOTES
"Subjective   History of Present Illness    Patient is a 33-year-old female presenting to ED with postoperative incisional complication.  PMH significant for recent bilateral brachioplasty.  Patient states on 4/2/2024 she had a bilateral brachioplasty performed by Dr. Naranjo in [facility] in [place].  Patient states that she is subsequently returned home and was supposed to have drains in place for at least 2 to 6 days per the recommendation of her surgeon.  Patient states that the drain on the left side was removed after 3 days however the drain on the right side fell out 1 day prior.  Patient states that she began noticing slight opening of the incisions bilaterally closest to the axillary region and started having significant redness and swelling along her right arm incision on 4/9/2024.  Patient was subsequently seen by her primary care provider locally, given a Rocephin injection and started on oral Bactrim.  Patient states that she followed up at her primary care provider yesterday because of lack of improvement and continued swelling, redness as well as discomfort on the right arm greater than the left.  Patient has been in communication with her plastic surgeon in [place] for which she has been sending pictures and she states initially on 4/9 it was recommended she have an incision and drain placed to the right side.  Patient states that this is what prompted her to initially see her primary care provider and they trialed antibiotic therapy first.  Patient states that while the swelling and redness is slightly improving on the right side the drainage is continuing to \"look like boogers and mucus.\"  Patient states that she is continuing to have a significant amount of drainage for which when she followed up with her primary care provider yesterday she was advised she needed an ultrasound and possible drain placement.  Patient denies fevers, chills, diaphoresis, nausea, or vomiting.  Patient states that there is " no scheduled postoperative follow-up with her surgeon in [place] and she has no local follow-up from a plastic surgery standpoint.  Patient states that she also had she had liposuction performed and denies any complications or abnormalities to this wound.  Outside of antibiotics patient denies use of any further medications and presents at this time for further evaluation.    Patient provides information for her plastic surgeon:  Dr. Naranjo, [facility] in [place]  [phone]    Records reviewed show patient was most recently seen outpatient at the primary care provider office on 4/10/2024 for postoperative wound infection.    Review of Systems   Constitutional: Negative.  Negative for chills, diaphoresis and fever.   HENT: Negative.     Eyes: Negative.    Respiratory: Negative.     Cardiovascular: Negative.    Gastrointestinal: Negative.  Negative for nausea and vomiting.   Genitourinary: Negative.    Musculoskeletal: Negative.  Negative for arthralgias and myalgias.   Skin:  Positive for color change (Redness to right arm surgical incision) and wound (Bilateral arm surgical incisions).   Neurological: Negative.  Negative for weakness and numbness.   Psychiatric/Behavioral: Negative.     All other systems reviewed and are negative.      No past medical history on file.    Allergies   Allergen Reactions    Fish-Derived Products Anaphylaxis    Kiwi Extract Anaphylaxis    Morphine Anaphylaxis     Cardiac arrest    Mustard Anaphylaxis    Pineapple Anaphylaxis, Hives and Rash    Shellfish-Derived Products Anaphylaxis    Wasp Venom Protein Swelling and Angioedema    Tuberculin, Ppd Swelling    Latex Rash    Penicillins Hives       Past Surgical History:   Procedure Laterality Date     SECTION      x 3    ENDOSCOPY N/A 2022    Procedure: ESOPHAGOGASTRODUODENOSCOPY WITH ANESTHESIA;  Surgeon: Bill Yeung MD;  Location: [facility] ENDOSCOPY;  Service: General;  Laterality: N/A;  preop; GERD; obesity  postop; normal    PCP Rachel Villa, APRN       Family History   Problem Relation Age of Onset    Cancer Mother     Hypertension Mother     Heart disease Mother     Obesity Mother     Sleep apnea Mother     Cancer Father     No Known Problems Sister     Obesity Brother     Hypertension Brother     Sleep apnea Brother     Cancer Maternal Grandmother     Obesity Maternal Grandmother     Hypertension Maternal Grandmother     Heart disease Maternal Grandfather     Hypertension Maternal Grandfather     Cancer Paternal Grandmother     Heart disease Paternal Grandfather        Social History     Socioeconomic History    Marital status:    Tobacco Use    Smoking status: Former    Smokeless tobacco: Never    Tobacco comments:     smoked socially quit several ago   Substance and Sexual Activity    Alcohol use: Yes     Alcohol/week: 2.0 standard drinks of alcohol     Types: 1 Glasses of wine, 1 Shots of liquor per week     Comment: occassionally           Objective   Physical Exam  Vitals and nursing note reviewed.   Constitutional:       General: She is not in acute distress.     Appearance: Normal appearance. She is obese. She is not ill-appearing, toxic-appearing or diaphoretic.   HENT:      Head: Normocephalic.      Mouth/Throat:      Mouth: Mucous membranes are moist.      Pharynx: Oropharynx is clear.   Eyes:      Conjunctiva/sclera: Conjunctivae normal.      Pupils: Pupils are equal, round, and reactive to light.   Cardiovascular:      Rate and Rhythm: Normal rate and regular rhythm.      Pulses: Normal pulses.           Radial pulses are 2+ on the right side and 2+ on the left side.   Pulmonary:      Effort: Pulmonary effort is normal.      Breath sounds: Normal breath sounds.   Abdominal:      Palpations: Abdomen is soft.   Musculoskeletal:         General: Tenderness present. Normal range of motion.      Cervical back: Normal range of motion.      Comments: Bilateral surgical incisions to the upper extremities consistent  with recent brachioplasty.  There is wound dehiscence on the proximalmost aspect of the left incision at approximately 2 cm in length with very scant purulent drainage.  Minimal erythema along this incision with no further swelling, tenderness or complications.  Large area of dehiscence on the proximalmost aspect of the right brachioplasty incision with slight early dehiscence on the distalmost aspect as well as two thirds of the way down.  Surrounding this right incision there is erythema, tenderness, swelling as well as on the bandage multiple areas of purulent drainage.  Bilateral upper EXTR are neurovascularly intact distally with no further acute MSK or dermatological abnormalities.   Skin:     General: Skin is warm.      Findings: Erythema (as described in MSK section) and wound (as described in MSK section) present.   Neurological:      Mental Status: She is alert and oriented to person, place, and time.      Sensory: No sensory deficit.      Motor: No weakness (5/5 symmetric strength to bilateral UE).      Gait: Gait normal.   Psychiatric:         Mood and Affect: Mood normal.         Behavior: Behavior normal.           RIGHT AXILLA:        RIGHT UPPER ARM:        LEFT UPPER ARM:            Procedures           ED Course                                             Medical Decision Making  Problems Addressed:  S/P brachioplasty: complicated acute illness or injury  Surgical wound infection: complicated acute illness or injury  Wound dehiscence: complicated acute illness or injury    Amount and/or Complexity of Data Reviewed  External Data Reviewed: labs, radiology and notes.  Labs: ordered. Decision-making details documented in ED Course.  Radiology: ordered. Decision-making details documented in ED Course.  ECG/medicine tests: ordered. Decision-making details documented in ED Course.    Risk  Prescription drug management.      Patient is a 33-year-old female presenting to ED with postoperative incisional  complication.  PMH significant for recent bilateral brachioplasty.  Upon initial evaluation patient resting comfortably in bed in no acute distress, nontoxic-appearing, non-ill-appearing.  Patient is hypertensive with otherwise stable vital signs.  Examination reveals bilateral surgical incisions to the upper extremities consistent with recent brachioplasty. There is wound dehiscence on the proximalmost aspect of the left incision at approximately 2 cm in length with very scant purulent drainage.  Minimal erythema along this incision with no further swelling, tenderness or complications. Large area of dehiscence on the proximalmost aspect of the right brachioplasty incision with slight early dehiscence on the distalmost aspect as well as two thirds of the way down.  Surrounding this right incision there is erythema, tenderness, swelling as well as on the bandage multiple areas of purulent drainage. Bilateral upper EXTR are neurovascularly intact distally with no further acute MSK or dermatological abnormalities.  Discussed with patient need for lab work, IV antibiotics, as well as consult with her surgeon and possibly local plastic surgeon for which patient is amenable with no further questions, concerns, or needs at this time.    Differential diagnosis: Surgical wound infection, wound dehiscence, cellulitis, abscess, sepsis, systemic infection, other    Work up revealed no acute lab abnormalities including low concern for systemic infection with normal blood cell count, normal lactic acid and further low concern for systemic bacterial process with normal procalcitonin.  CBC otherwise showed H&H stable at 11.3/35.1, normal platelets and no further acute abnormalities.  CMP unremarkable with no electrolyte disturbances, normal renal and hepatic function.  Wound cultures were obtained from each axillary wound dehiscence region. Phone discussion with the office of patient's plastic surgeon.  States that they are aware  of patient and should wound have dehiscence at this time in the setting of normal lab work there is no further indication for drain at this time.  No further recommendations.  Patient was given a fluid bolus as well as dose of vancomycin and advised on need for continued outpatient Bactrim.  Discussed need to establish local follow-up with her primary care provider, plastic surgery, or wound care and for reevaluation within the next 48 hours.  Discussed strict return precautions and need for immediate return to ED should she develop any new or worsening symptoms.  Patient continues to have bilateral per extremities neurovascular intact distally is appreciative with no further questions, concerns, needs at this time and is stable for discharge.    Final diagnoses:   S/P brachioplasty   Wound dehiscence   Surgical wound infection       ED Disposition  ED Disposition       ED Disposition   Discharge    Condition   Stable    Comment   --               Rachel Villa, APRN  [address] [facility] [id number] [phone]    Schedule an appointment as soon as possible for a visit in 2 days      Jordan Burt MD  [address]  [facility] [address]  [phone]    Call   As needed    [facility] EMERGENCY DEPARTMENT  [address] [address] [phone]    As needed         Medication List      No changes were made to your prescriptions during this visit.            Petr Rojas PA-C  04/13/24 0125

## 2024-04-12 NOTE — Clinical Note
[facility] EMERGENCY DEPARTMENT  [address]  [facility] [address]  Phone: [phone]    Hannah Anguiano was seen and treated in our emergency department on 4/12/2024.  She may return to work on 04/15/2024.         Thank you for choosing [facility].    Petr Rojas PA-C

## 2024-04-12 NOTE — Clinical Note
[facility] EMERGENCY DEPARTMENT  [address]  [facility] [address]  Phone: [phone]    Hannah Anguiano was seen and treated in our emergency department on 4/12/2024.  She may return to work on 04/15/2024.         Thank you for choosing [facility].    Petr Rojas PA-C

## 2024-04-13 NOTE — DISCHARGE INSTRUCTIONS
Please complete the Bactrim antibiotic which were you were previously given in its entirety.  You will need to follow-up locally with your primary care provider or a plastic surgeon for continued monitoring.  Please discuss with your primary care provider as well as a referral to wound care for further monitoring.  Should you develop any new or worsening symptoms please return to the ER for further evaluation.

## 2024-04-16 LAB
BACTERIA SPEC AEROBE CULT: ABNORMAL
GRAM STN SPEC: ABNORMAL

## 2024-04-17 LAB
BACTERIA SPEC AEROBE CULT: NORMAL
BACTERIA SPEC AEROBE CULT: NORMAL

## 2025-05-14 NOTE — PROGRESS NOTES
"   Patient Care Team:  Rachel Villa APRN as PCP - General (Family Medicine)    Reason for Visit:  Surgical Weight loss    Subjective      Hannah Anguiano is a pleasant 31 y.o. female and presents with morbid obesity with her Body mass index is 46.51 kg/m².    She is here for discussion of weight loss options.  She stated she has been with the disease of obesity for {duration:11125}.  She stated she suffers from *** and morbid obesity due to her weight gain.  She stated that *** helps alleviate these symptoms.   She stated that she has tried *** to help with weight loss.  She stated that she has attempted these conservative methods for weight loss without maintaining long term success.  Today she would like to discuss surgical weight loss options such as the Laparoscopic Sleeve Gastrectomy or the Laparoscopic R - Y Gastric Bypass.      Review of Systems  {ros master:683317}    History  {History:35329}      Current Outpatient Medications:   •  albuterol sulfate  (90 Base) MCG/ACT inhaler, Inhale 2 puffs Every 4 (Four) Hours As Needed for Wheezing., Disp: , Rfl:   •  EPINEPHrine, Anaphylaxis, (ADRENALIN) 1 MG/ML injection, Inject  under the skin into the appropriate area as directed., Disp: , Rfl:   •  Loratadine (CLARITIN PO), Take  by mouth., Disp: , Rfl:     Objective     Vital Signs  Temp:  [98.4 °F (36.9 °C)] 98.4 °F (36.9 °C)  Heart Rate:  [89] 89  BP: (130)/(87) 130/87  Body mass index is 46.51 kg/m².      12/21/21  1338   Weight: 117 kg (258 lb 6.4 oz)       {PHYSICAL EXAM WITH PROVIDER CHOICES:47557}      Results Review:   {Results Review:41384::\"I reviewed the patient's new clinical results.\"}        Assessment/Plan   Encounter Diagnoses   Name Primary?   • Class 3 severe obesity due to excess calories without serious comorbidity with body mass index (BMI) of 45.0 to 49.9 in adult (HCC) Yes       ***  I discussed the patient's findings and my recommendations with {discussed with:61052::\"patient\"}. " Please let patient know SIBO test is positive.     Recommend treatment with a combination of two antibiotics x 14 days:  -- neomycin 500 mg twice daily x 14 days. Dispense 28 tablets with 0 refills.   AND  -- rifaximin 550 mg three times daily for 14 days. Dispense 42 tablets with 0 refills.     -- Make sure patient starts treatment with both antibiotics at the same time.  -- Take the medication with or without food.   - NO probiotics during treatment for SIBO  -- call in 3 weeks with updates      Thank you   "The patient was made aware that we are primarily a surgical program.  We reviewed different weight loss surgical procedures including the laparoscopic sleeve gastrectomy, gastric band and Coleen-en-Y gastric bypass.  I explained to the patient that medical treatment alone is ineffective for long-term results and for the reversal of morbid obesity. She and I discussed the etiology of the disease of morbid obesity.  I emphasized that weight loss through conservative methods alone statistically will not reverse this disease of obesity long-term.    Our program is not a \"weight loss program\" but focuses on the overall issue of morbid obesity and the patient has been educated on what steps will be necessary to be successful in reversing this disease of morbid obesity at our facility.  The patient will require further evaluation of her foregut either through an upper endoscopy/EGD or radiographic studies.  I have explained the 3 pearls of our program for the patient to follow to optimize success:1.  Putting their health first, 2.  Not trying to treat the disease on their own, 3.  Attempting to make their scheduled appointments.  The patient was in agreement to following these recommendations.  The patient was also notified that our dietitian will be contacting them soon for follow-up on how they are doing with the new prescription.    I have also recommended that she obtain *** prior to surgery consideration.      Dr. Bill Yeung MD FACS    12/21/21  14:03 CST  Patient Care Team:  Rachel Villa APRN as PCP - General (Family Medicine)    "

## (undated) DEVICE — SENSR O2 OXIMAX FNGR A/ 18IN NONSTR

## (undated) DEVICE — Device: Brand: DEFENDO AIR/WATER/SUCTION AND BIOPSY VALVE

## (undated) DEVICE — CONMED SCOPE SAVER BITE BLOCK, 20X27 MM: Brand: SCOPE SAVER

## (undated) DEVICE — TBG SMPL FLTR LINE NASL 02/C02 A/ BX/100

## (undated) DEVICE — THE CHANNEL CLEANING BRUSH IS A NYLON FLEXI BRUSH ATTACHED TO A FLEXIBLE PLASTIC SHEATH DESIGNED TO SAFELY REMOVE DEBRIS FROM FLEXIBLE ENDOSCOPES.

## (undated) DEVICE — CUFF,BP,DISP,1 TUBE,ADULT,HP: Brand: MEDLINE

## (undated) DEVICE — ENDOGATOR AUXILIARY WATER JET CONNECTOR: Brand: ENDOGATOR

## (undated) DEVICE — FRCP BX RADJAW4 NDL 2.8 240 STD OG